# Patient Record
Sex: FEMALE | Race: ASIAN | NOT HISPANIC OR LATINO | URBAN - METROPOLITAN AREA
[De-identification: names, ages, dates, MRNs, and addresses within clinical notes are randomized per-mention and may not be internally consistent; named-entity substitution may affect disease eponyms.]

---

## 2023-06-09 ENCOUNTER — OFFICE VISIT (OUTPATIENT)
Dept: URBAN - METROPOLITAN AREA SURGERY CENTER 13 | Facility: SURGERY CENTER | Age: 63
End: 2023-06-09

## 2023-07-16 ENCOUNTER — WEB ENCOUNTER (OUTPATIENT)
Dept: URBAN - METROPOLITAN AREA CLINIC 82 | Facility: CLINIC | Age: 63
End: 2023-07-16

## 2023-08-01 ENCOUNTER — CLAIMS CREATED FROM THE CLAIM WINDOW (OUTPATIENT)
Dept: URBAN - METROPOLITAN AREA CLINIC 4 | Facility: CLINIC | Age: 63
End: 2023-08-01
Payer: COMMERCIAL

## 2023-08-01 ENCOUNTER — WEB ENCOUNTER (OUTPATIENT)
Dept: URBAN - METROPOLITAN AREA SURGERY CENTER 13 | Facility: SURGERY CENTER | Age: 63
End: 2023-08-01

## 2023-08-01 ENCOUNTER — OFFICE VISIT (OUTPATIENT)
Dept: URBAN - METROPOLITAN AREA SURGERY CENTER 13 | Facility: SURGERY CENTER | Age: 63
End: 2023-08-01
Payer: COMMERCIAL

## 2023-08-01 DIAGNOSIS — Z12.11 COLON CANCER SCREENING: ICD-10-CM

## 2023-08-01 DIAGNOSIS — D12.2 ADENOMA OF ASCENDING COLON: ICD-10-CM

## 2023-08-01 DIAGNOSIS — D12.2 BENIGN NEOPLASM OF ASCENDING COLON: ICD-10-CM

## 2023-08-01 PROCEDURE — 88305 TISSUE EXAM BY PATHOLOGIST: CPT | Performed by: PATHOLOGY

## 2023-08-01 PROCEDURE — G8907 PT DOC NO EVENTS ON DISCHARG: HCPCS | Performed by: INTERNAL MEDICINE

## 2023-08-01 PROCEDURE — 45385 COLONOSCOPY W/LESION REMOVAL: CPT | Performed by: INTERNAL MEDICINE

## 2025-06-16 PROBLEM — 102614006: Status: ACTIVE | Noted: 2025-06-16

## 2025-06-16 PROBLEM — 429047008: Status: ACTIVE | Noted: 2025-06-16

## 2025-06-17 ENCOUNTER — DASHBOARD ENCOUNTERS (OUTPATIENT)
Age: 65
End: 2025-06-17

## 2025-06-17 ENCOUNTER — OFFICE VISIT (OUTPATIENT)
Dept: URBAN - METROPOLITAN AREA CLINIC 128 | Facility: CLINIC | Age: 65
End: 2025-06-17
Payer: COMMERCIAL

## 2025-06-17 ENCOUNTER — LAB OUTSIDE AN ENCOUNTER (OUTPATIENT)
Dept: URBAN - METROPOLITAN AREA CLINIC 128 | Facility: CLINIC | Age: 65
End: 2025-06-17

## 2025-06-17 DIAGNOSIS — R10.84 GENERALIZED ABDOMINAL PAIN: ICD-10-CM

## 2025-06-17 DIAGNOSIS — Z86.0101 HISTORY OF ADENOMATOUS POLYP OF COLON: ICD-10-CM

## 2025-06-17 PROCEDURE — 99214 OFFICE O/P EST MOD 30 MIN: CPT | Performed by: STUDENT IN AN ORGANIZED HEALTH CARE EDUCATION/TRAINING PROGRAM

## 2025-06-17 RX ORDER — PREDNISONE 5 MG/1
1 TABLET WITH FOOD OR MILK TABLET ORAL ONCE A DAY
Status: ACTIVE | COMMUNITY

## 2025-06-17 RX ORDER — ETANERCEPT 50 MG/ML
1 ML SOLUTION SUBCUTANEOUS
Status: ACTIVE | COMMUNITY

## 2025-06-17 NOTE — HPI-TODAY'S VISIT:
6/17/2025:  David: 63 yo F seen previously for Direct Access Colonoscopy by Dr. FARZAD Heath in Aug 2023 with a 4 mm TA polyp found along with internal and external hemorrhoids is seen today in clinic.  Due for recall colonoscopy in Aug 2028, based on her last colonoscopy.  Had diverticulitis in Feb 2025 and was treated with antibiotics.  Since then she has continued to have changes in bowel habits, non-specific abdominal pain.  Constipation.  Also 2 weeks of upper abdominal pain. + burping.  Lower abdominal pain with unable to distinguish whether she needs to urinate or defecate.  Stool is now 1 or 4 on the Archuleta scale but at baseline around 3.  No blood in stools.  No flatulance.  No fevers or chills.  Pain resolves with defecation.   No urgency or leakage.  Pelvic pain.    Of note, her daugher-in-law is a PA for our GI inpatient service at [facility].

## 2025-06-18 ENCOUNTER — OFFICE VISIT (OUTPATIENT)
Dept: URBAN - METROPOLITAN AREA CLINIC 19 | Facility: CLINIC | Age: 65
End: 2025-06-18

## 2025-06-20 LAB
ABSOLUTE BASOPHILS: 50
ABSOLUTE EOSINOPHILS: 141
ABSOLUTE LYMPHOCYTES: 2183
ABSOLUTE MONOCYTES: 963
ABSOLUTE NEUTROPHILS: 4963
BASOPHILS: 0.6
EOSINOPHILS: 1.7
H PYLORI BREATH TEST: NOT DETECTED
HEMATOCRIT: 38.5
HEMOGLOBIN: 12.1
LYMPHOCYTES: 26.3
MCH: 27.9
MCHC: 31.4
MCV: 88.9
MONOCYTES: 11.6
MPV: 10.4
NEUTROPHILS: 59.8
PLATELET COUNT: 234
RDW: 13.3
RED BLOOD CELL COUNT: 4.33
WHITE BLOOD CELL COUNT: 8.3

## 2025-07-03 ENCOUNTER — TELEPHONE ENCOUNTER (OUTPATIENT)
Dept: URBAN - METROPOLITAN AREA CLINIC 19 | Facility: CLINIC | Age: 65
End: 2025-07-03

## 2025-07-03 RX ORDER — HYOSCYAMINE SULFATE 0.12 MG/1
1 TABLET AS NEEDED TABLET ORAL
Qty: 180 TABLET | Refills: 0 | OUTPATIENT
Start: 2025-07-03

## 2025-07-21 ENCOUNTER — OFFICE VISIT (OUTPATIENT)
Dept: URBAN - METROPOLITAN AREA CLINIC 82 | Facility: CLINIC | Age: 65
End: 2025-07-21